# Patient Record
Sex: MALE | Race: BLACK OR AFRICAN AMERICAN | Employment: FULL TIME | URBAN - METROPOLITAN AREA
[De-identification: names, ages, dates, MRNs, and addresses within clinical notes are randomized per-mention and may not be internally consistent; named-entity substitution may affect disease eponyms.]

---

## 2020-05-31 ENCOUNTER — APPOINTMENT (OUTPATIENT)
Dept: GENERAL RADIOLOGY | Age: 25
End: 2020-05-31
Payer: MEDICARE

## 2020-05-31 ENCOUNTER — HOSPITAL ENCOUNTER (EMERGENCY)
Age: 25
Discharge: HOME | End: 2020-06-01
Attending: EMERGENCY MEDICINE
Payer: MEDICARE

## 2020-05-31 ENCOUNTER — APPOINTMENT (OUTPATIENT)
Dept: CT IMAGING | Age: 25
End: 2020-05-31
Payer: MEDICARE

## 2020-05-31 PROCEDURE — 72125 CT NECK SPINE W/O DYE: CPT

## 2020-05-31 PROCEDURE — 96374 THER/PROPH/DIAG INJ IV PUSH: CPT

## 2020-05-31 PROCEDURE — 2500000003 HC RX 250 WO HCPCS: Performed by: NURSE PRACTITIONER

## 2020-05-31 PROCEDURE — 70450 CT HEAD/BRAIN W/O DYE: CPT

## 2020-05-31 PROCEDURE — 96375 TX/PRO/DX INJ NEW DRUG ADDON: CPT

## 2020-05-31 PROCEDURE — 6360000002 HC RX W HCPCS

## 2020-05-31 PROCEDURE — 99284 EMERGENCY DEPT VISIT MOD MDM: CPT

## 2020-05-31 PROCEDURE — 6360000002 HC RX W HCPCS: Performed by: NURSE PRACTITIONER

## 2020-05-31 PROCEDURE — 72192 CT PELVIS W/O DYE: CPT

## 2020-05-31 PROCEDURE — 73502 X-RAY EXAM HIP UNI 2-3 VIEWS: CPT

## 2020-05-31 PROCEDURE — 2500000003 HC RX 250 WO HCPCS

## 2020-05-31 PROCEDURE — 73552 X-RAY EXAM OF FEMUR 2/>: CPT

## 2020-05-31 RX ORDER — ONDANSETRON 2 MG/ML
4 INJECTION INTRAMUSCULAR; INTRAVENOUS ONCE
Status: COMPLETED | OUTPATIENT
Start: 2020-05-31 | End: 2020-05-31

## 2020-05-31 RX ORDER — KETAMINE HYDROCHLORIDE 10 MG/ML
INJECTION, SOLUTION INTRAMUSCULAR; INTRAVENOUS
Status: COMPLETED
Start: 2020-05-31 | End: 2020-05-31

## 2020-05-31 RX ORDER — KETAMINE HYDROCHLORIDE 10 MG/ML
100 INJECTION, SOLUTION INTRAMUSCULAR; INTRAVENOUS ONCE
Status: COMPLETED | OUTPATIENT
Start: 2020-05-31 | End: 2020-05-31

## 2020-05-31 RX ORDER — ETOMIDATE 2 MG/ML
INJECTION INTRAVENOUS
Status: COMPLETED
Start: 2020-05-31 | End: 2020-05-31

## 2020-05-31 RX ORDER — FENTANYL CITRATE 50 UG/ML
50 INJECTION, SOLUTION INTRAMUSCULAR; INTRAVENOUS ONCE
Status: COMPLETED | OUTPATIENT
Start: 2020-05-31 | End: 2020-05-31

## 2020-05-31 RX ORDER — PROPOFOL 10 MG/ML
100 INJECTION, EMULSION INTRAVENOUS ONCE
Status: COMPLETED | OUTPATIENT
Start: 2020-05-31 | End: 2020-05-31

## 2020-05-31 RX ORDER — PROPOFOL 10 MG/ML
INJECTION, EMULSION INTRAVENOUS
Status: COMPLETED
Start: 2020-05-31 | End: 2020-05-31

## 2020-05-31 RX ADMIN — KETAMINE HYDROCHLORIDE 50 MG: 10 INJECTION, SOLUTION INTRAMUSCULAR; INTRAVENOUS at 22:27

## 2020-05-31 RX ADMIN — PROPOFOL INJECTABLE EMULSION 50 MG: 10 INJECTION, EMULSION INTRAVENOUS at 22:27

## 2020-05-31 RX ADMIN — KETAMINE HYDROCHLORIDE 50 MG: 10 INJECTION INTRAMUSCULAR; INTRAVENOUS at 22:27

## 2020-05-31 RX ADMIN — ETOMIDATE 4 MG: 2 INJECTION, SOLUTION INTRAVENOUS at 22:35

## 2020-05-31 RX ADMIN — FENTANYL CITRATE 50 MCG: 50 INJECTION, SOLUTION INTRAMUSCULAR; INTRAVENOUS at 21:47

## 2020-05-31 RX ADMIN — KETAMINE HYDROCHLORIDE 50 MG: 10 INJECTION INTRAMUSCULAR; INTRAVENOUS at 22:32

## 2020-05-31 RX ADMIN — ONDANSETRON 4 MG: 2 INJECTION INTRAMUSCULAR; INTRAVENOUS at 21:46

## 2020-05-31 RX ADMIN — PROPOFOL 50 MG: 10 INJECTION, EMULSION INTRAVENOUS at 22:27

## 2020-05-31 ASSESSMENT — PAIN SCALES - GENERAL
PAINLEVEL_OUTOF10: 0
PAINLEVEL_OUTOF10: 0
PAINLEVEL_OUTOF10: 10
PAINLEVEL_OUTOF10: 0
PAINLEVEL_OUTOF10: 10

## 2020-06-01 ENCOUNTER — TELEPHONE (OUTPATIENT)
Dept: ORTHOPEDIC SURGERY | Age: 25
End: 2020-06-01

## 2020-06-01 VITALS
HEART RATE: 73 BPM | BODY MASS INDEX: 21.87 KG/M2 | TEMPERATURE: 98 F | DIASTOLIC BLOOD PRESSURE: 92 MMHG | WEIGHT: 165 LBS | SYSTOLIC BLOOD PRESSURE: 140 MMHG | HEIGHT: 73 IN | OXYGEN SATURATION: 96 % | RESPIRATION RATE: 17 BRPM

## 2020-06-01 RX ORDER — HYDROCODONE BITARTRATE AND ACETAMINOPHEN 5; 325 MG/1; MG/1
1 TABLET ORAL EVERY 6 HOURS PRN
Qty: 6 TABLET | Refills: 0 | Status: SHIPPED | OUTPATIENT
Start: 2020-06-01 | End: 2020-06-04

## 2020-06-01 RX ORDER — IBUPROFEN 800 MG/1
800 TABLET ORAL EVERY 8 HOURS PRN
Qty: 21 TABLET | Refills: 0 | Status: SHIPPED | OUTPATIENT
Start: 2020-06-01 | End: 2020-06-10 | Stop reason: SDUPTHER

## 2020-06-01 NOTE — ED PROVIDER NOTES
PELVIS WO CONTRAST Additional Contrast? None   Final Result   1. Proper position of the left femoral head in relation to the left   acetabulum. 2. No acute fractures in the pelvic bones including left acetabulum   and left pubic bone. 3. More likely old avulsions off the anterior superior rim of the left   and right acetabulum. 4. No acute fractures in the proximal left and right femurs. XR HIP LEFT (2-3 VIEWS)   Final Result   1. Reduction of previous posterior dislocation of the left hip joint. 2. Fracture of undetermined age in the superior lateral rim of the   left acetabulum. 3. No acute fractures seen in the left femoral head and neck   intertrochanteric region proximal femoral shaft are.      4. Cannot see in the present study conspicuous fracture in the left   pubic bone as suspected on the initial study. XR FEMUR LEFT (MIN 2 VIEWS)   Final Result   1. Complete posterior dislocation of the left hip joint. 2. Suspected a fracture of the left pubic bone. 3. Femoral condyles not covered on this study. ALERT:  ABNORMAL REPORT. XR PELVIS (MIN 3 VIEWS)    (Results Pending)       ------------------------- NURSING NOTES AND VITALS REVIEWED ---------------------------   The nursing notes within the ED encounter and vital signs as below have been reviewed. BP (!) 140/92   Pulse 73   Temp 98 °F (36.7 °C) (Oral)   Resp 17   Ht 6' 1\" (1.854 m)   Wt 165 lb (74.8 kg)   SpO2 96%   BMI 21.77 kg/m²   Oxygen Saturation Interpretation: Normal      ---------------------------------------------------PHYSICAL EXAM--------------------------------------      Constitutional/General: Alert and oriented x3, well appearing, non toxic in NAD  Head: Normocephalic and atraumatic  Eyes: PERRL, EOMI  Mouth: Oropharynx clear, handling secretions, no trismus  Neck: Supple, full ROM,   Pulmonary: Lungs clear to auscultation bilaterally, no wheezes, rales, or rhonchi.  Not in Complications: none    Electronically Signed by: Mamie Carbajal MD       ED COURSE:       Medical Decision Making: Plan will be for imaging will also medicate for symptom relief. X-ray of the left hip resulted showing complete posterior dislocation of the left hip joint. Suspected fracture of the left pubic bone and femoral condyles not covered on the study. Consents were obtained and left hip successively placed back into proper position and alignment and post reduction film did confirm reduction of the previous posterior dislocation of left hip joint no acute fracture seen in the left femoral head and neck. Will still obtain CT of the pelvis as well as CT head and cervical spine. CT of the brain showed no indication for any acute intercranial process, CT cervical spine also completely unremarkable, CT pelvis showing no acute fractures in the pelvic bones including the left acetabulum and left pubic bone. There is also no acute fractures in the proximal left and right femurs and there is proper position of the left femoral head in relationship to the left acetabulum. Patient resting at this time. Anticipate discharge to half-way. [employer] currently at bedside. Patient currently awake alert oriented x4. Plan will be for discharge back to half-way. Patient with knee immobilizer in place he will also be sent home with crutches. Patient made aware of the importance of following up with orthopedic physician will be discharged home with Motrin and small amount of Norco.  Patient with strong pulses 2+ bilaterally to lower extremities. Patient otherwise neurovascular hemodynamically intact. Patient expressed understanding and safely discharged home       Counseling: The emergency provider has spoken with the patient and discussed todays results, in addition to providing specific details for the plan of care and counseling regarding the diagnosis and prognosis.   Questions are answered at this time and they are agreeable with the plan.      --------------------------------- IMPRESSION AND DISPOSITION ---------------------------------    IMPRESSION  1. Closed posterior dislocation of left hip, initial encounter ([facility] [address]    2. Contusion of face, initial encounter    3. Closed head injury, initial encounter        DISPOSITION  Disposition: Discharge to home  Patient condition is good      NOTE: This report was transcribed using voice recognition software. Every effort was made to ensure accuracy; however, inadvertent computerized transcription errors may be present     LUIS FERNANDO Valle CNP  06/01/20 0154  ATTENDING PROVIDER ATTESTATION:     I have personally performed and/or participated in the history, exam, medical decision making, and procedures and agree with all pertinent clinical information. I have also reviewed and agree with the past medical, family and social history unless otherwise noted. My findings/Plan: Left hip pain. Patient reports he was tackled by police. Patient reporting no abdominal pain or chest pain he reports no difficulty breathing. Patient has noted abrasions to his face. Patient denies any drug use. Patient is awake alert oriented he is in mild distress. He has noted facial abrasions. Heart lung exam normal abdomen is soft and nontender he has tenderness to his left hip he is unable to fully extend the hip. Pulses are intact distally. He has normal sensation. X-rays were obtained and reviewed. Patient agreeable to conscious sedation he was made aware of risks and benefits. Patient also agreeable for reduction of the hip. Patient was sedated here and please see above note patient tolerated procedure well and repeat x-rays were done and show no dislocation as CT of his pelvis is also ordered there is no acute fractures noted. Patient was rechecked several times here after consultation lungs are clear he is awake alert oriented vital signs are stable.   He reports no chest pain no abdominal pain. He is able to move his ankle plantarflex and dorsiflex. Pulses are intact distally and sensation is intact. Patient observed here after sedation . Patient is stable. Patient will be discharged.        Devendra Santos MD  06/01/20 6972       Devendra Santos MD  06/01/20 7914

## 2020-06-03 ENCOUNTER — APPOINTMENT (OUTPATIENT)
Dept: GENERAL RADIOLOGY | Age: 25
End: 2020-06-03
Payer: MEDICARE

## 2020-06-03 ENCOUNTER — HOSPITAL ENCOUNTER (EMERGENCY)
Age: 25
Discharge: HOME | End: 2020-06-03
Payer: MEDICARE

## 2020-06-03 VITALS
HEART RATE: 71 BPM | BODY MASS INDEX: 21.77 KG/M2 | SYSTOLIC BLOOD PRESSURE: 129 MMHG | RESPIRATION RATE: 16 BRPM | OXYGEN SATURATION: 96 % | WEIGHT: 165 LBS | DIASTOLIC BLOOD PRESSURE: 79 MMHG | TEMPERATURE: 97 F

## 2020-06-03 PROCEDURE — 73564 X-RAY EXAM KNEE 4 OR MORE: CPT

## 2020-06-03 PROCEDURE — 99283 EMERGENCY DEPT VISIT LOW MDM: CPT

## 2020-06-03 PROCEDURE — 6360000002 HC RX W HCPCS: Performed by: PHYSICIAN ASSISTANT

## 2020-06-03 PROCEDURE — 96372 THER/PROPH/DIAG INJ SC/IM: CPT

## 2020-06-03 PROCEDURE — 73502 X-RAY EXAM HIP UNI 2-3 VIEWS: CPT

## 2020-06-03 PROCEDURE — 6370000000 HC RX 637 (ALT 250 FOR IP): Performed by: PHYSICIAN ASSISTANT

## 2020-06-03 RX ORDER — DIAPER,BRIEF,INFANT-TODD,DISP
EACH MISCELLANEOUS ONCE
Status: COMPLETED | OUTPATIENT
Start: 2020-06-03 | End: 2020-06-03

## 2020-06-03 RX ORDER — KETOROLAC TROMETHAMINE 30 MG/ML
30 INJECTION, SOLUTION INTRAMUSCULAR; INTRAVENOUS ONCE
Status: COMPLETED | OUTPATIENT
Start: 2020-06-03 | End: 2020-06-03

## 2020-06-03 RX ORDER — DIAPER,BRIEF,INFANT-TODD,DISP
EACH MISCELLANEOUS
Status: DISCONTINUED
Start: 2020-06-03 | End: 2020-06-03 | Stop reason: HOSPADM

## 2020-06-03 RX ADMIN — BACITRACIN ZINC: 500 OINTMENT TOPICAL at 20:55

## 2020-06-03 RX ADMIN — KETOROLAC TROMETHAMINE 30 MG: 30 INJECTION, SOLUTION INTRAMUSCULAR at 20:55

## 2020-06-03 ASSESSMENT — PAIN DESCRIPTION - LOCATION: LOCATION: KNEE;HIP

## 2020-06-03 ASSESSMENT — PAIN DESCRIPTION - PAIN TYPE: TYPE: ACUTE PAIN

## 2020-06-03 ASSESSMENT — PAIN SCALES - GENERAL
PAINLEVEL_OUTOF10: 4
PAINLEVEL_OUTOF10: 4

## 2020-06-03 ASSESSMENT — PAIN DESCRIPTION - ORIENTATION: ORIENTATION: LEFT

## 2020-06-03 ASSESSMENT — PAIN DESCRIPTION - FREQUENCY: FREQUENCY: CONTINUOUS

## 2020-06-03 NOTE — TELEPHONE ENCOUNTER
Call placed to [facility], spoke with [employer]. shelter unable to provide transportation on 6/9, appt made for 6/10 at 12:00. She verbally confirmed appt date and time.

## 2020-06-10 ENCOUNTER — OFFICE VISIT (OUTPATIENT)
Dept: ORTHOPEDIC SURGERY | Age: 25
End: 2020-06-10
Payer: MEDICARE

## 2020-06-10 ENCOUNTER — HOSPITAL ENCOUNTER (OUTPATIENT)
Dept: GENERAL RADIOLOGY | Age: 25
Discharge: HOME | End: 2020-06-12
Payer: MEDICARE

## 2020-06-10 VITALS
HEIGHT: 73 IN | DIASTOLIC BLOOD PRESSURE: 64 MMHG | WEIGHT: 160 LBS | HEART RATE: 64 BPM | BODY MASS INDEX: 21.2 KG/M2 | SYSTOLIC BLOOD PRESSURE: 132 MMHG

## 2020-06-10 PROCEDURE — G8420 CALC BMI NORM PARAMETERS: HCPCS | Performed by: ORTHOPAEDIC SURGERY

## 2020-06-10 PROCEDURE — 99202 OFFICE O/P NEW SF 15 MIN: CPT | Performed by: ORTHOPAEDIC SURGERY

## 2020-06-10 PROCEDURE — 99203 OFFICE O/P NEW LOW 30 MIN: CPT | Performed by: ORTHOPAEDIC SURGERY

## 2020-06-10 PROCEDURE — 73502 X-RAY EXAM HIP UNI 2-3 VIEWS: CPT

## 2020-06-10 PROCEDURE — G8427 DOCREV CUR MEDS BY ELIG CLIN: HCPCS | Performed by: ORTHOPAEDIC SURGERY

## 2020-06-10 PROCEDURE — 1036F TOBACCO NON-USER: CPT | Performed by: ORTHOPAEDIC SURGERY

## 2020-06-10 RX ORDER — IBUPROFEN 800 MG/1
800 TABLET ORAL EVERY 8 HOURS PRN
Qty: 21 TABLET | Refills: 0 | Status: SHIPPED
Start: 2020-06-10 | End: 2020-07-22

## 2020-06-10 NOTE — PROGRESS NOTES
processes  Abdomen: soft, nondistended  Resp:   resp easy and unlabored, no audible wheezes note, normal symmetrical expansion of both hemithoraces  Cardiac: distal pulses palpable, skin and extremities well perfused  Neurological: alert, oriented X3, normal speech, no focal findings or movement disorder noted, motor and sensory grossly normal bilaterally, normal muscle tone, no tremors, strength 5/5, normal gait and station  HEENT: normochephalic atraumatic, external ears and eyes normal, sclera normal, neck supple, no nasal discharge. Extremities:   peripheral pulses normal, no edema, redness or tenderness in the calves   Skin: normal coloration, no rashes or open wounds, no suspicious skin lesions noted  Psych: Affect euthymic   Musculoskeletal:   Extremity:  Left lower extremity   Skin intact   Compartments soft and compressible   Calves soft and non tender    5/5 EHL, TA, GS   2/4 DP and PT pulses   Sensation intact distally   Capillary refill less than 3 seconds       /64   Pulse 64   Ht 6' 1\" (1.854 m)   Wt 160 lb (72.6 kg)   BMI 21.11 kg/m²      XR: 6/10/20 concentric reduction of left hip joint with a small piece of acetabulum fracture, posterior wall     ASSESSMENT:     Diagnosis Orders   1. History of closed hip dislocation           Discussion: Talked with him detail about [employer] weight-bear with removing the brace and starting some physical therapy. He will return to work on July 5 this is happy with this.     PLAN:    Session weightbearing right lower extremity we restarted left lower extremity    ELECTRONICALLY signed by:    Suzanna Bhatia MD  6/10/20

## 2020-06-10 NOTE — LETTER
[address]  [address]  [place] [address]  Phone: [phone]  Fax: [phone]    Renato Meza MD        Brenda 10, 2020     Patient: Ivelisse Cohen   YOB: 1995   Date of Visit: 6/10/2020       To Whom It May Concern: It is my medical opinion that Ivelisse Cohen may return to work 06/22/20 with no restrictions. If you have any questions or concerns, please don't hesitate to call.     Sincerely,        Renato Meza MD

## 2020-06-23 ENCOUNTER — HOSPITAL ENCOUNTER (OUTPATIENT)
Dept: PHYSICAL THERAPY | Age: 25
Setting detail: THERAPIES SERIES
Discharge: HOME | End: 2020-06-23
Payer: COMMERCIAL

## 2020-06-23 PROCEDURE — 97161 PT EVAL LOW COMPLEX 20 MIN: CPT | Performed by: PHYSICAL THERAPIST

## 2020-06-23 PROCEDURE — 97110 THERAPEUTIC EXERCISES: CPT | Performed by: PHYSICAL THERAPIST

## 2020-06-23 ASSESSMENT — PAIN SCALES - GENERAL: PAINLEVEL_OUTOF10: 4

## 2020-06-23 ASSESSMENT — PAIN DESCRIPTION - LOCATION: LOCATION: HIP

## 2020-06-23 ASSESSMENT — PAIN DESCRIPTION - ORIENTATION: ORIENTATION: LEFT

## 2020-06-23 ASSESSMENT — PAIN DESCRIPTION - PAIN TYPE: TYPE: ACUTE PAIN

## 2020-06-23 NOTE — PROGRESS NOTES
Physical Therapy  Initial Assessment  Date: 2020  Patient Name: Miguel Clayton  MRN: [id number]  : 1995    Subjective   General  Additional Pertinent Hx: Pt suffered a left hip dislocation on 20. Pt's hip was reduced in the ER. Pt also developed some left knee swelling due to injury, however,  X-rays of the knee were unremarkable. Referring Practitioner: Kaylen Dawn MD  Referral Date : 06/10/20  Diagnosis: Left closed hip dislocation  PT Visit Information  Onset Date: 20  PT Insurance Information: [employer]  Subjective  Subjective: Pt reports pain primarily in the left hip and minimal pain in the left knee. 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 Pt currently off work due to injury. Additional Comments: Pt has a 3 yo daughter    Objective     AROM RLE (degrees)  RLE AROM: WNL  AROM LLE (degrees)  LLE AROM : Exceptions  L Hip Flexion 0-125: 103°  L Hip Extension 0-10: 0°  L Hip ABduction 0-45: 28°  L Hip ADduction 0-10: 15°    Strength RLE  Strength RLE: WNL  Strength LLE  Strength LLE: Exception  Comment: Hip 4-/5, knee 4/5     Ambulation  Ambulation?: Yes  Ambulation 1  Device: No Device  Assistance: Independent  Quality of Gait: Moderate antalgic gait on L LE with left hip abducted and externally rotated during gait  Stairs/Curb  Stairs?: Yes  Stairs  # Steps : 3  Rails: Bilateral  Assistance: Modified independent   Comment: Alternating reciprocal and non-reciprocal pattern     Assessment   Conditions Requiring Skilled Therapeutic Intervention  Body structures, Functions, Activity limitations: Decreased functional mobility ; Decreased ROM; Decreased strength;Decreased endurance; Increased pain  Prognosis: Good  Decision Making: Low Complexity  REQUIRES PT FOLLOW UP: Yes  Activity Tolerance  Activity Tolerance: Patient limited by pain         Plan   Plan  Times per week: 2x/week  Plan weeks: 6 weeks  Current Treatment Recommendations: Strengthening, ROM, Functional Mobility Training, Gait Training, Endurance Training, Stair training, Pain Management, Home Exercise Program, Modalities, Patient/Caregiver Education & Training, Manual Therapy - Soft Tissue Mobilization, Neuromuscular Re-education    Goals  Short term goals  Time Frame for Short term goals: 3 weeks/6 visits  Short term goal 1: Decrease c/o pain to 3/10  Short term goal 2: Increase left hip ROM by 5° to 10°  Short term goal 3: Increase L LE strength by 1/2 MMT  Short term goal 4: Pt will demonstrate a minimally antalgic gait pattern  Short term goal 5: Pt will be demonstrate a good compliance and tolerance to HEP  Long term goals  Time Frame for Long term goals : 6 weeks/12 visits  Long term goal 1: Decrease c/o pain to 0-2/10  Long term goal 2: Increase left hip ROM to [facility] throughout  Long term goal 3: Increase L LE strength to 4+ to 5/5 throughout  Long term goal 4: Pt will demonstrate a fluid, pain-free gait pattern  Long term goal 5: Pt will demonstrate a fluid, reciprocal pattern with stair negotiation  Patient Goals   Patient goals : To return to work       Therapy Time   [address] [facility]   Time In 0808         Time Out 0845         Minutes 37         Timed Code Treatment Minutes: [address] [facility],[address], [address]  If you have any questions or concerns, please don't hesitate to call.   Thank you for your referral.    Physician Signature:________________________________Date:__________________  By signing above, therapists plan is approved by physician

## 2020-06-25 ENCOUNTER — HOSPITAL ENCOUNTER (OUTPATIENT)
Dept: PHYSICAL THERAPY | Age: 25
Setting detail: THERAPIES SERIES
Discharge: HOME | End: 2020-06-25
Payer: COMMERCIAL

## 2020-06-25 PROCEDURE — 97110 THERAPEUTIC EXERCISES: CPT | Performed by: PHYSICAL THERAPIST

## 2020-06-30 ENCOUNTER — HOSPITAL ENCOUNTER (OUTPATIENT)
Dept: PHYSICAL THERAPY | Age: 25
Setting detail: THERAPIES SERIES
Discharge: HOME | End: 2020-06-30
Payer: COMMERCIAL

## 2020-06-30 PROCEDURE — 97110 THERAPEUTIC EXERCISES: CPT

## 2020-06-30 NOTE — PROGRESS NOTES
[address] [address] [place]      Phone: [phone]  Fax: [phone]    Physical Therapy Daily Treatment Note  Date:  2020    Patient Name:  Melodie Dunn    :  1995  MRN: [id number]    Restrictions/Precautions:    Diagnosis:  Left closed hip dislocation  Treatment Diagnosis:    Insurance/Certification information:  [employer]  Referring Physician:  Elis Laughlin MD  Plan of care signed (Y/N):    Visit# / total visits:  3/12  Pain level: 2/10   Time In:  930  Time Out:  955     Subjective:  Pt reports he feels sore    Exercises:  Exercise/Equipment Resistance/Repetitions Other comments     Recumbent bike 15 revolutions Seat @15 and reclined     Heel slides x15 reps      SAQ x15 reps      Seated hip add with basketball 5 sec x 15 reps      Seated hip flex x15 reps      Hip flex with PFB 15 sec x 5 reps      SLR x5 reps AROM      Supine hip abd x15 reps      Standing hip abd, ext, SLR x10 reps L LE only                                                                               Other Therapeutic Activities:  N/A    Home Exercise Program:  20 - seated hip add with pillow/ball; 20 - SLR    Manual Treatments:  N/A    Modalities:  N/A    Timed Code Treatment Minutes:  25  Total Treatment Minutes:  25    Treatment/Activity Tolerance:  [x] Patient tolerated treatment well [] Patient limited by fatigue  [] Patient limited by pain  [] Patient limited by other medical complications  [] Other:     Prognosis: [x] Good [] Fair  [] Poor    Patient Requires Follow-up: [x] Yes  [] No    Plan:   [x] Continue per plan of care [] Alter current plan (see comments)  [] Plan of care initiated [] Hold pending MD visit [] Discharge  Plan for Next Session:        Electronically signed by:  Joseluis Ricketts, PTA [id number]

## 2020-07-06 ENCOUNTER — APPOINTMENT (OUTPATIENT)
Dept: PHYSICAL THERAPY | Age: 25
End: 2020-07-06
Payer: COMMERCIAL

## 2020-07-07 ENCOUNTER — TELEPHONE (OUTPATIENT)
Dept: ORTHOPEDIC SURGERY | Age: 25
End: 2020-07-07

## 2020-07-07 ENCOUNTER — HOSPITAL ENCOUNTER (OUTPATIENT)
Dept: PHYSICAL THERAPY | Age: 25
Setting detail: THERAPIES SERIES
Discharge: HOME | End: 2020-07-07
Payer: COMMERCIAL

## 2020-07-07 PROCEDURE — 97110 THERAPEUTIC EXERCISES: CPT

## 2020-07-07 NOTE — TELEPHONE ENCOUNTER
Will need to know typical job duties before writing restrictions, patient will most likely need to have breaks from standing/walking and be able to sit/change position as needed for comfort until seen back in office on 7/22  Electronically signed by Anatoly Mills PA-C on 7/7/2020 at 2:30 PM

## 2020-07-07 NOTE — TELEPHONE ENCOUNTER
Adria called he was at PT today and they feel he is not ready for full duty. The therapist advised Adria to contact the office to see if he can be released with restrictions.          Electronically signed by Maya Li on 7/7/2020 at 1:34 PM

## 2020-07-07 NOTE — PROGRESS NOTES
[address]      Phone: [phone]  Fax: [phone]    Physical Therapy Daily Treatment Note  Date:  2020    Patient Name:  Payal Cheng    :  1995  MRN: [id number]    Restrictions/Precautions:    Diagnosis:  Left closed hip dislocation  Treatment Diagnosis:    Insurance/Certification information:  [place] [employer]age  Referring Physician:  Norberto Mead MD  Plan of care signed (Y/N):    Visit# / total visits:    Pain level: 2/10   Time In:  09  Time Out:  958     Subjective:  Pt reports he feels sore. 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  Therapist instructed pt to contact Dr. Raymundo Riggs to check on potential activity limitations d/t his diagnosis and work environment    Exercises:  Exercise/Equipment Resistance/Repetitions Other comments     Recumbent bike 3 mins  Slow revolutions w/ emphasis on maintaining L LE hip/knee alignment Seat @15 and reclined     Heel slides x20 reps      SAQ x20 reps      Seated hip add with basketball 5 sec x 15 reps      Seated hip flex x20 reps      Hip flex with PFB 15 sec x 5 reps      SLR 2 x 5 reps AROM      Supine hip abd x20 reps      Standing hip abd, ext, SLR x15 reps L LE only                                                                               Other Therapeutic Activities:  N/A    Home Exercise Program:  20 - seated hip add with pillow/ball; 20 - SLR    Manual Treatments:  N/A    Modalities:  N/A    Timed Code Treatment Minutes:  29  Total Treatment Minutes:  29    Treatment/Activity Tolerance:  [x] Patient tolerated treatment well [] Patient limited by fatigue  [] Patient limited by pain  [] Patient limited by other medical complications  [] Other:     Prognosis: [x] Good [] Fair  [] Poor    Patient Requires Follow-up: [x] Yes  [] No    Plan:   [x] Continue per plan of care [] Alter current plan (see comments)  [] Plan of care initiated [] Hold pending MD visit [] Discharge  Plan for Next Session:        Electronically signed by:  Liya Rajan, PTA [id number]

## 2020-07-09 ENCOUNTER — APPOINTMENT (OUTPATIENT)
Dept: PHYSICAL THERAPY | Age: 25
End: 2020-07-09
Payer: COMMERCIAL

## 2020-07-09 ENCOUNTER — TELEPHONE (OUTPATIENT)
Dept: ORTHOPEDIC SURGERY | Age: 25
End: 2020-07-09

## 2020-07-09 NOTE — TELEPHONE ENCOUNTER
Please draft letter for signature.   Electronically signed by Elvia Hewitt PA-C on 7/9/2020 at 4:12 PM

## 2020-07-09 NOTE — TELEPHONE ENCOUNTER
Adria needs his return to work letter to state work restrictions. As of right now he is working 7 days a week 10 hour days on his feet. He stated that is too much for him with his hip. He would like to be able to take breaks when needed and only work 40 hours a week for now. Please fax letter to his place of employment. Fax number [phone].   Electronically signed by Christina Segovia MA on 7/9/2020 at 3:12 PM

## 2020-07-10 ENCOUNTER — HOSPITAL ENCOUNTER (OUTPATIENT)
Dept: PHYSICAL THERAPY | Age: 25
Setting detail: THERAPIES SERIES
Discharge: HOME | End: 2020-07-10
Payer: COMMERCIAL

## 2020-07-10 PROCEDURE — 97110 THERAPEUTIC EXERCISES: CPT

## 2020-07-10 NOTE — PROGRESS NOTES
[address]      Phone: [phone]  Fax: [phone]    Physical Therapy Daily Treatment Note  Date:  7/10/2020    Patient Name:  Simone Nunez    :  1995  MRN: [id number]    Restrictions/Precautions:    Diagnosis:  Left closed hip dislocation  Treatment Diagnosis:    Insurance/Certification information:  [place] [employer]age  Referring Physician:  Moira Arce MD  Plan of care signed (Y/N):    Visit# / total visits:    Pain level: 2/10   Time In:  0935  Time Out:  1005     Subjective:  Pt reports he contacted the doctors office about returning to work, and would he have any restrictions. 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 They expressed to him ( per pt ) that he should return to work and speak w/ his employer about modifying his job, until he can be seen by the doctor. The doctors office expressed to pt ( per pt ) that the doctor would have to see him in the office to determine if restrictions for pt's job were necessary . Pt reported his follow up appointment is 2020.        Exercises:  Exercise/Equipment Resistance/Repetitions Other comments     Recumbent bike 3 mins  Slow revolutions w/ emphasis on maintaining L LE hip/knee alignment Seat @15 and reclined     Heel slides x20 reps      SAQ x20 reps      Seated hip add with basketball 5 sec x 15 reps      Seated hip flex x20 reps      Hip flex with PFB 15 sec x 5 reps      SLR 2 x 5 reps AROM      Supine hip abd x20 reps      Standing hip abd, ext, SLR x15 reps L LE only                                                                               Other Therapeutic Activities:  N/A    Home Exercise Program:  20 - seated hip add with pillow/ball; 20 - SLR    Manual Treatments:  N/A    Modalities:  N/A    Timed Code Treatment Minutes:  30  Total Treatment Minutes:  30    Treatment/Activity Tolerance:  [x] Patient tolerated treatment well [] Patient limited by fatigue  []

## 2020-07-14 ENCOUNTER — HOSPITAL ENCOUNTER (OUTPATIENT)
Dept: PHYSICAL THERAPY | Age: 25
Setting detail: THERAPIES SERIES
Discharge: HOME | End: 2020-07-14
Payer: COMMERCIAL

## 2020-07-14 PROCEDURE — 97110 THERAPEUTIC EXERCISES: CPT

## 2020-07-14 NOTE — PROGRESS NOTES
[address] [address] [place]      Phone: [phone]  Fax: [phone]    Physical Therapy Daily Treatment Note  Date:  2020    Patient Name:  Shraddha Whitlock    :  1995  MRN: [id number]    Restrictions/Precautions:    Diagnosis:  Left closed hip dislocation  Treatment Diagnosis:    Insurance/Certification information:  [place] [employer]vantage  Referring Physician:  López Irvin MD  Plan of care signed (Y/N):    Visit# / total visits:    Pain level: 2/10   Time In:  0938  Time Out:  1003     Subjective:  Pt reported he is not returning to work at this time after speaking w/ his employer , and awaiting follow up with Dr. Darryle Joiner      Exercises:  Exercise/Equipment Resistance/Repetitions Other comments     Recumbent bike 5 mins  Slow revolutions w/ emphasis on maintaining L LE hip/knee alignment Seat @15 and reclined     Heel slides X 20 reps      SAQ 2-3 sec x 20 reps      Seated hip add with basketball 5 sec x 15 reps      Seated hip flex x20 reps      Hip flex with PFB 15 sec x 5 reps      SLR 2 x 5 reps AROM      Supine hip abd x20 reps      Standing hip abd, ext, SLR x15 reps L LE only                treadmill TBA                                                              Other Therapeutic Activities:  N/A    Home Exercise Program:  20 - seated hip add with pillow/ball; 20 - SLR    Manual Treatments:  N/A    Modalities:  N/A    Timed Code Treatment Minutes:  25  Total Treatment Minutes:  25    Treatment/Activity Tolerance:  [x] Patient tolerated treatment well [] Patient limited by fatigue  [] Patient limited by pain  [] Patient limited by other medical complications  [] Other:     Prognosis: [x] Good [] Fair  [] Poor    Patient Requires Follow-up: [x] Yes  [] No    Plan:   [x] Continue per plan of care [] Alter current plan (see comments)  [] Plan of care initiated [] Hold pending MD visit [] Discharge  Plan for Next Session: Electronically signed by:  Diego Mcwilliams, PTA [id number]

## 2020-07-22 ENCOUNTER — HOSPITAL ENCOUNTER (OUTPATIENT)
Dept: GENERAL RADIOLOGY | Age: 25
Discharge: HOME | End: 2020-07-24
Payer: COMMERCIAL

## 2020-07-22 ENCOUNTER — OFFICE VISIT (OUTPATIENT)
Dept: ORTHOPEDIC SURGERY | Age: 25
End: 2020-07-22
Payer: COMMERCIAL

## 2020-07-22 VITALS
DIASTOLIC BLOOD PRESSURE: 75 MMHG | SYSTOLIC BLOOD PRESSURE: 121 MMHG | HEIGHT: 73 IN | HEART RATE: 67 BPM | WEIGHT: 160 LBS | BODY MASS INDEX: 21.2 KG/M2

## 2020-07-22 PROCEDURE — 99213 OFFICE O/P EST LOW 20 MIN: CPT | Performed by: PHYSICIAN ASSISTANT

## 2020-07-22 PROCEDURE — 99212 OFFICE O/P EST SF 10 MIN: CPT | Performed by: PHYSICIAN ASSISTANT

## 2020-07-22 PROCEDURE — G8420 CALC BMI NORM PARAMETERS: HCPCS | Performed by: PHYSICIAN ASSISTANT

## 2020-07-22 PROCEDURE — 1036F TOBACCO NON-USER: CPT | Performed by: PHYSICIAN ASSISTANT

## 2020-07-22 PROCEDURE — G8427 DOCREV CUR MEDS BY ELIG CLIN: HCPCS | Performed by: PHYSICIAN ASSISTANT

## 2020-07-22 PROCEDURE — 73502 X-RAY EXAM HIP UNI 2-3 VIEWS: CPT

## 2020-07-22 NOTE — PATIENT INSTRUCTIONS
Modified work hours, letters given  Tylenol and or ibuprofen as needed for soreness    Continue therapy    Follow up 6 weeks hopefully to anticipate full return to work

## 2020-07-22 NOTE — LETTER
[address]  [address]  [address] [facility] [address]  Phone: [phone]  Fax: [phone]    Rubina Gonzalez PA-C        July 22, 2020     Patient: Alka Pierre   YOB: 1995   Date of Visit: 7/22/2020       To Whom It May Concern: It is my medical opinion that Alka Pierre may return to work with an hours restriction as follows:  -for the next 2 weeks patient to work 40 hour work week, 8 hours a day x 5 days  -the following 2 weeks OK to work 8 hours a day x 6 days  -the following 2 weeks OK to advance to 8 hours a day x 6 days a week. Will see patient back after this to reevaluate and anticipate releasing to full duty at that time    If you have any questions or concerns, please don't hesitate to call.     Sincerely,        Rubina Gonzalez PA-C

## 2020-07-24 NOTE — PROGRESS NOTES
Chief Complaint   Patient presents with    Follow-up     left hip dislocation    Hip Injury     left hip; 1/10 pain; patient states he has been feeling good for the last week;     Other     patient states he has been going to PT and it is helping a lot       Wilian Leger is an 21 y.o. male who presents the office today for follow-up on a left posterior hip dislocation on 5/31/2020. Patient underwent a closed reduction, patient presents today weightbearing as tolerated, no assistive device use, no brace use. Patient returned to work full duty but is having a hard time given the duration time standing on his feet. Patient states he works 10-hour shifts 6-7 days/week. Patient states he would like to discuss possible modifications for work at this time. Patient is participating in physical therapy. He feels this is significantly helpful. Patient has mild discomfort of hip intermittently but nothing consistently. Patient denies any numbness, tingling, fevers, chills, sweats. He denies any other orthopedic complaints or paresthesias. Review of Systems -   General ROS: negative for - chills, fatigue, fever or night sweats  Respiratory ROS: no cough, shortness of breath, or wheezing  Cardiovascular ROS: no chest pain or dyspnea on exertion  Gastrointestinal ROS: no abdominal pain, change in bowel habits, or black or bloody stools  Genitourinary: no hematuria, dysuria, or incontinence   Musculoskeletal ROS:see above  Neurological ROS: no TIA or stroke symptoms       OBJECTIVE:   Alert and oriented X 3, no acute distress, respirations easy and unlabored with no audible wheezes, skin warm and dry, speech and dress appropriate for noted age, affect euthymic.     Extremity:  Left lower extremity  Skin intact - clean dry and intact without signs of infection  No TTP about the left hip or hemipelvis  No pain with log roll of the hip  No instability appreciated with ROM of the hip  Compartments soft and compressible  Calves soft and non tender   5/5 EHL, TA, GS  2/4 DP and PT pulses  Sensation intact distally  Capillary refill less than 3 seconds     XR: AP pelvis and 2 views of the left hip demonstrate the femoral head concentrically located in the acetabulum, there is a small piece of the posterior wall outside the joint, this is no interval change. There is no evidence of avascular necrosis on today's x-rays. /75 (Site: Right Upper Arm, Position: Sitting, Cuff Size: Medium Adult)   Pulse 67   Ht 6' 1\" (1.854 m)   Wt 160 lb (72.6 kg)   BMI 21.11 kg/m²     ASSESSMENT:     Diagnosis Orders   1. History of closed hip dislocation         PLAN:  Discussed the patient a modified work hours and gradual progression of returning to his baseline hours and days work. Patient to continue with physical therapy as this is continuing to help. Patient may use over-the-counter Tylenol or ibuprofen as needed. Provided anticipatory guidance of signs symptoms that would warrant urgent follow-up including weightbearing pain, worsening groin pain. Patient expressed understanding and all questions were answered at bedside and is agreeable this plan of care. Patient will follow-up in 6 weeks for reevaluation as he is escalating his return to work progression. Electronically signed by Elvia Hewitt PA-C on 7/24/2020 at 8:05 AM  Note: This report was completed using [employer] voiced recognition software. Every effort has been made to ensure accuracy; however, inadvertent computerized transcription errors may be present.

## 2021-08-24 NOTE — ED NOTES
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  Mom stated the patient would like the letter E-mailed to him stating that he has celiac's. E-mail: [email or web address]  Mom stated if there is any questions to please contact the patient.      Patient Name: Naresh Oleary  Caller Name: Naresh  Callback Number: [phone]  Did you confirm the message with the caller?: yes    Thank you,  Dorothea Becker     Pt left for XR     Milton Price RN  05/31/20 7953